# Patient Record
Sex: FEMALE | Race: WHITE | NOT HISPANIC OR LATINO | Employment: UNEMPLOYED | URBAN - METROPOLITAN AREA
[De-identification: names, ages, dates, MRNs, and addresses within clinical notes are randomized per-mention and may not be internally consistent; named-entity substitution may affect disease eponyms.]

---

## 2017-01-19 ENCOUNTER — OFFICE VISIT (OUTPATIENT)
Dept: OBSTETRICS AND GYNECOLOGY | Facility: CLINIC | Age: 37
End: 2017-01-19
Payer: MEDICAID

## 2017-01-19 VITALS
HEIGHT: 62 IN | SYSTOLIC BLOOD PRESSURE: 102 MMHG | BODY MASS INDEX: 18.3 KG/M2 | DIASTOLIC BLOOD PRESSURE: 60 MMHG | WEIGHT: 99.44 LBS

## 2017-01-19 DIAGNOSIS — Z79.890 ON HORMONE REPLACEMENT THERAPY: Primary | ICD-10-CM

## 2017-01-19 PROCEDURE — 99212 OFFICE O/P EST SF 10 MIN: CPT | Mod: PBBFAC,PO | Performed by: MIDWIFE

## 2017-01-19 PROCEDURE — 99499 UNLISTED E&M SERVICE: CPT | Mod: S$PBB,,, | Performed by: MIDWIFE

## 2017-01-19 PROCEDURE — 99999 PR PBB SHADOW E&M-EST. PATIENT-LVL II: CPT | Mod: PBBFAC,,, | Performed by: MIDWIFE

## 2017-01-19 RX ORDER — ESTRADIOL 2 MG/1
2 TABLET ORAL DAILY
Refills: 4 | COMMUNITY
Start: 2016-12-17 | End: 2017-02-13 | Stop reason: ALTCHOICE

## 2017-01-19 RX ORDER — OMEPRAZOLE 40 MG/1
40 CAPSULE, DELAYED RELEASE ORAL EVERY MORNING
Refills: 2 | COMMUNITY
Start: 2016-12-10 | End: 2020-01-24

## 2017-01-19 RX ORDER — LISINOPRIL 5 MG/1
5 TABLET ORAL DAILY
Refills: 2 | COMMUNITY
Start: 2017-01-03 | End: 2020-01-24

## 2017-01-19 NOTE — LETTER
January 19, 2017      Naren Garcia MD  [address] [place] [id number]           [facility]  [id number] [place] [address]  Phone: [phone]          Patient: Lyndsey Vigil   MR Number: [id number]   YOB: 1980   Date of Visit: 1/19/2017       Dear Dr. Naren Garcia:    Thank you for referring Lyndsey Vigil to me for evaluation. Attached you will find relevant portions of my assessment and plan of care.    If you have questions, please do not hesitate to call me. I look forward to following Lyndsey Vigil along with you.    Sincerely,    Sukhi Light, [facility]    Enclosure  CC:  No Recipients    If you would like to receive this communication electronically, please contact [email or web address] or [phone] to request more information on [facility] Link access.    For providers and/or their staff who would like to refer a patient to [facility], please contact us through our one-stop-shop provider referral line, [facility] , at [phone].    If you feel you have received this communication in error or would no longer like to receive these types of communications, please e-mail [email or web address]

## 2017-01-19 NOTE — MR AVS SNAPSHOT
"    [facility]  [address] [address] [address]  Phone: [phone]                  Lyndsey Vigil   2017 9:30 AM   Office Visit    Description:  Female : 1980   Provider:  Sukhi Light CNM   Department:  [facility]                To Do List           Future Appointments        Provider Department Dept Phone    2017 11:30 AM Destini La MD [facility]--[phone]      Goals (5 Years of Data)     None      [facility] On Call     [facility] On Call Nurse Care Line -  Assistance  Registered nurses in the [facility] On Call Center provide clinical advisement, health education, appointment booking, and other advisory services.  Call for this free service at [phone].             Medications                Verify that the below list of medications is an accurate representation of the medications you are currently taking.  If none reported, the list may be blank. If incorrect, please contact your healthcare provider. Carry this list with you in case of emergency.           Current Medications     alprazolam (XANAX) 1 MG tablet Take 1 mg by mouth 2 (two) times daily.    estradiol (ESTRACE) 2 MG tablet Take 2 mg by mouth once daily.    lisinopril (PRINIVIL,ZESTRIL) 5 MG tablet Take 5 mg by mouth once daily.    omeprazole (PRILOSEC) 40 MG capsule Take 40 mg by mouth every morning.    ondansetron (ZOFRAN) 4 MG tablet Take 8 mg by mouth every 8 (eight) hours.    zolpidem (AMBIEN) 10 mg Tab Take 5 mg by mouth nightly as needed.           Clinical Reference Information           Vital Signs - Last Recorded  Most recent update: 2017  9:42 AM by Liz Maher LPN    Ht Wt BMI          5' 2" (1.575 m) 45.1 kg (99 lb 6.8 oz) 18.19 kg/m2        Allergies as of 2017     Nsaids (Non-steroidal Anti-inflammatory Drug)    Pcn [Penicillins]      Immunizations Administered on Date of Encounter - 2017     None      [facility] Sign-Up     Activating your " [facility] account is as easy as 1-2-3!     1) Visit [email or web address], select Sign Up Now, enter this activation code and your date of birth, then select Next.  [email or web address]  Expires: 3/5/2017  9:57 AM      2) Create a username and password to use when you visit [facility] in the future and select a security question in case you lose your password and select Next.    3) Enter your e-mail address and click Sign Up!    Additional Information  If you have questions, please e-mail [email or web address] or call [phone] to talk to our [facility] staff. Remember, [facility] is NOT to be used for urgent needs. For medical emergencies, dial 911.         Smoking Cessation     If you would like to quit smoking:   You may be eligible for free services if you are a [place] resident and started smoking cigarettes before September 1, 1988.  Call the Smoking Cessation Trust (SCT) toll free at (099) 549-1868 or (167) 844-8122.   Call 6-179-QUIT-NOW if you do not meet the above criteria.

## 2017-02-13 ENCOUNTER — TELEPHONE (OUTPATIENT)
Dept: OBSTETRICS AND GYNECOLOGY | Facility: CLINIC | Age: 37
End: 2017-02-13

## 2017-02-13 ENCOUNTER — OFFICE VISIT (OUTPATIENT)
Dept: OBSTETRICS AND GYNECOLOGY | Facility: CLINIC | Age: 37
End: 2017-02-13
Payer: MEDICAID

## 2017-02-13 VITALS — BODY MASS INDEX: 17.74 KG/M2 | WEIGHT: 97 LBS | SYSTOLIC BLOOD PRESSURE: 98 MMHG | DIASTOLIC BLOOD PRESSURE: 60 MMHG

## 2017-02-13 DIAGNOSIS — R68.82 LIBIDO, DECREASED: ICD-10-CM

## 2017-02-13 DIAGNOSIS — E89.40 POSTSURGICAL MENOPAUSE: Primary | ICD-10-CM

## 2017-02-13 PROCEDURE — 99212 OFFICE O/P EST SF 10 MIN: CPT | Mod: PBBFAC,PO | Performed by: OBSTETRICS & GYNECOLOGY

## 2017-02-13 PROCEDURE — 99999 PR PBB SHADOW E&M-EST. PATIENT-LVL II: CPT | Mod: PBBFAC,,, | Performed by: OBSTETRICS & GYNECOLOGY

## 2017-02-13 PROCEDURE — 99213 OFFICE O/P EST LOW 20 MIN: CPT | Mod: S$PBB,,, | Performed by: OBSTETRICS & GYNECOLOGY

## 2017-02-13 RX ORDER — SUMATRIPTAN SUCCINATE 25 MG/1
1 TABLET ORAL CONTINUOUS PRN
Refills: 1 | COMMUNITY
Start: 2017-01-31 | End: 2020-01-24

## 2017-02-13 RX ORDER — ESTERIFIED ESTROGEN AND METHYLTESTOSTERONE .625; 1.25 MG/1; MG/1
1 TABLET ORAL DAILY
Qty: 30 TABLET | Refills: 5 | Status: SHIPPED | OUTPATIENT
Start: 2017-02-13 | End: 2017-02-14

## 2017-02-13 NOTE — TELEPHONE ENCOUNTER
Spoke with the Prior authorization department and estratest is not covered on her insurance plan. The only alternative is to prescribe the medications separately.

## 2017-02-15 RX ORDER — ESTRADIOL 2 MG/1
2 TABLET ORAL DAILY
Qty: 30 TABLET | Refills: 11 | Status: SHIPPED | OUTPATIENT
Start: 2017-02-15 | End: 2018-02-17 | Stop reason: SDUPTHER

## 2017-02-15 NOTE — TELEPHONE ENCOUNTER
Testosterone cream 1mg daily called to compounding pharmacy-they will call pt. Can increase to 2mg if no improvement after 2months. Estrace will be refilled as previously taken, to normal pharmacy

## 2017-02-15 NOTE — TELEPHONE ENCOUNTER
Spoke with the patient and notified her that Dr La sent the testosterone cream to the compound pharmacy and the estrace will still be sent to the regular pharmacy. If it doesn't help then we can increase it after 2 months. Patient verbalized understanding and it's going to cost her $50 a month, joann

## 2017-02-15 NOTE — TELEPHONE ENCOUNTER
Spoke with the patient to let her know what was going on and she said that she is fine with using the compound pharmacy and is fine with coming to [facility] to get it. I informed the patient I would let Dr La know and she will call it in for her. Patient verbalized understanding, Radha

## 2017-02-15 NOTE — TELEPHONE ENCOUNTER
Pt will have to get Rx from compounding pharmacy. Methyltestosterone only comes in 10mg tablets which is more than is recommended. Will have to call in

## 2017-02-15 NOTE — PROGRESS NOTES
*pt seen   Subjective:       Patient ID: Lyndsey Vigil is a 36 y.o. female.    Chief Complaint:  Consult (hrt)      History of Present Illness  HPI  35 yo  presents as new pt to discuss ERT. Has been tried on multiple types since hyst/bso, was doing better on estrace 2mg but now not working as well. Also c/o decreased libido over past few years, causing strain in relationship.     Past Medical History   Diagnosis Date    Anxiety     Depression        Past Surgical History   Procedure Laterality Date    Hysterectomy      Oophorectomy      Breast surgery      Tubal ligation      [place] tooth extraction     *hyst/bso by Dr. Quyen Williamson secondary to menstrual disorder & ovarian cysts;     Family History   Problem Relation Age of Onset    Breast cancer Neg Hx     Cancer Neg Hx     Ovarian cancer Neg Hx        Social History     Social History    Marital status: Single     Spouse name: N/A    Number of children: N/A    Years of education: N/A     Social History Main Topics    Smoking status: Current Every Day Smoker     Packs/day: 1.00     Types: Cigarettes    Smokeless tobacco: Never Used    Alcohol use No    Drug use: Yes    Sexual activity: Yes     Partners: Male     Other Topics Concern    None     Social History Narrative       Current Outpatient Prescriptions   Medication Sig Dispense Refill    lisinopril (PRINIVIL,ZESTRIL) 5 MG tablet Take 5 mg by mouth once daily.  2    omeprazole (PRILOSEC) 40 MG capsule Take 40 mg by mouth every morning.  2    ondansetron (ZOFRAN) 4 MG tablet Take 8 mg by mouth every 8 (eight) hours.      sumatriptan (IMITREX) 25 MG Tab Take 1 tablet by mouth continuous prn.  1     No current facility-administered medications for this visit.        Review of patient's allergies indicates:   Allergen Reactions    Nsaids (non-steroidal anti-inflammatory drug)     Pcn [penicillins]        GYN & OB History  No LMP recorded. Patient has had a hysterectomy.    Date of Last Pap: 1 mo ago per pt-normal  Last mmg  normal per pt    OB History    Para Term  AB SAB TAB Ectopic Multiple Living   1         1      # Outcome Date GA Lbr Mark/2nd Weight Sex Delivery Anes PTL Lv   1                    Review of Systems  Review of Systems   All other systems reviewed and are negative.    Objective:    Physical Exam:   Constitutional: She is oriented to person, place, and time. She appears well-developed and well-nourished.        Pulmonary/Chest: Effort normal.                  Musculoskeletal: Normal range of motion.       Neurological: She is alert and oriented to person, place, and time.    Skin: Skin is warm and dry.    Psychiatric: She has a normal mood and affect. Her behavior is normal.          Assessment:        1. Postsurgical menopause    2. Libido, decreased      Plan:      1. R/b/a of continuing on ERT  2. R/b/a/se of testosterone off-label use for libido  3. Will try pt on estratest

## 2017-11-06 ENCOUNTER — HOSPITAL ENCOUNTER (OUTPATIENT)
Dept: RADIOLOGY | Facility: HOSPITAL | Age: 37
Discharge: HOME | End: 2017-11-06
Attending: NURSE PRACTITIONER
Payer: MEDICAID

## 2017-11-06 DIAGNOSIS — M54.50 LOW BACK PAIN: ICD-10-CM

## 2017-11-06 DIAGNOSIS — M41.20 SCOLIOSIS (AND KYPHOSCOLIOSIS), IDIOPATHIC: ICD-10-CM

## 2017-11-06 DIAGNOSIS — M41.20 SCOLIOSIS (AND KYPHOSCOLIOSIS), IDIOPATHIC: Primary | ICD-10-CM

## 2017-11-06 PROCEDURE — 72100 X-RAY EXAM L-S SPINE 2/3 VWS: CPT | Mod: 26,,, | Performed by: RADIOLOGY

## 2017-11-06 PROCEDURE — 72100 X-RAY EXAM L-S SPINE 2/3 VWS: CPT | Mod: TC,PO

## 2018-02-18 RX ORDER — ESTRADIOL 2 MG/1
TABLET ORAL
Qty: 30 TABLET | Refills: 11 | Status: SHIPPED | OUTPATIENT
Start: 2018-02-18 | End: 2019-03-08 | Stop reason: SDUPTHER

## 2019-03-12 ENCOUNTER — TELEPHONE (OUTPATIENT)
Dept: OBSTETRICS AND GYNECOLOGY | Facility: CLINIC | Age: 39
End: 2019-03-12

## 2019-03-12 RX ORDER — ESTRADIOL 2 MG/1
TABLET ORAL
Qty: 30 TABLET | Refills: 11 | Status: SHIPPED | OUTPATIENT
Start: 2019-03-12 | End: 2020-01-24 | Stop reason: ALTCHOICE

## 2019-03-12 NOTE — TELEPHONE ENCOUNTER
----- Message from Yadi Palacios sent at 3/12/2019 12:51 PM CDT -----  Contact: pt  States she is having problems with her bladder and going to the bathroom frequently. States she had a total hysterectomy five years ago. She has Medicaid, nothing coming up on the schedule. Please call pt [phone]. Thank you

## 2019-03-12 NOTE — TELEPHONE ENCOUNTER
Spoke to patient and scheduled her an appointment for 05/06/19 at 9:00am at the [facility] location to see Dr. La. Patient verbalized understanding.

## 2019-05-06 ENCOUNTER — OFFICE VISIT (OUTPATIENT)
Dept: OBSTETRICS AND GYNECOLOGY | Facility: CLINIC | Age: 39
End: 2019-05-06
Payer: MEDICAID

## 2019-05-06 VITALS
BODY MASS INDEX: 19.23 KG/M2 | SYSTOLIC BLOOD PRESSURE: 140 MMHG | WEIGHT: 105.19 LBS | DIASTOLIC BLOOD PRESSURE: 90 MMHG

## 2019-05-06 DIAGNOSIS — F41.9 ANXIETY: ICD-10-CM

## 2019-05-06 DIAGNOSIS — Z01.419 ENCOUNTER FOR GYNECOLOGICAL EXAMINATION WITHOUT ABNORMAL FINDING: Primary | ICD-10-CM

## 2019-05-06 PROCEDURE — 99395 PR PREVENTIVE VISIT,EST,18-39: ICD-10-PCS | Mod: S$PBB,,, | Performed by: OBSTETRICS & GYNECOLOGY

## 2019-05-06 PROCEDURE — 99999 PR PBB SHADOW E&M-EST. PATIENT-LVL II: CPT | Mod: PBBFAC,,, | Performed by: OBSTETRICS & GYNECOLOGY

## 2019-05-06 PROCEDURE — 99999 PR PBB SHADOW E&M-EST. PATIENT-LVL II: ICD-10-PCS | Mod: PBBFAC,,, | Performed by: OBSTETRICS & GYNECOLOGY

## 2019-05-06 PROCEDURE — 99395 PREV VISIT EST AGE 18-39: CPT | Mod: S$PBB,,, | Performed by: OBSTETRICS & GYNECOLOGY

## 2019-05-06 PROCEDURE — 99212 OFFICE O/P EST SF 10 MIN: CPT | Mod: PBBFAC,PO | Performed by: OBSTETRICS & GYNECOLOGY

## 2019-05-06 NOTE — PROGRESS NOTES
CC: Well woman exam    Lyndsey Vigil is a 38 y.o. female  presents for a well woman exam. Doing well on estrace 2mg. Did not f/u w/ estratest or testosterone use. Pt started taking matcha tablets to boost libido. C/o urinary problems-sits to void, feels like it is normal amount & that she is emptying but sits while she is on her phone & more come out that feels like a full amount. No incontinence/urgency/dysuria/nocturia. Drinks 2 c coffee & about 4 bottles water daily. Also c/o episodic anxiety; has had it in past but never medicated bc has been manageable; ex. Has anxiety about [place]. Has seen PCP who referred her to /therapist who wanted to put her on daily antidepressant; pt does not want to take anything daily    Past Medical History:   Diagnosis Date    Anxiety     Depression     Hypertension     Migraine      Past Surgical History:   Procedure Laterality Date    BREAST SURGERY      HYSTERECTOMY      OOPHORECTOMY      TUBAL LIGATION      WISDOM TOOTH EXTRACTION       Family History   Problem Relation Age of Onset    Breast cancer Neg Hx     Cancer Neg Hx     Ovarian cancer Neg Hx     Colon cancer Neg Hx      Social History     Tobacco Use    Smoking status: Current Some Day Smoker     Packs/day: 1.00     Types: Cigarettes, Vaping with nicotine    Smokeless tobacco: Never Used   Substance Use Topics    Alcohol use: No    Drug use: Yes     Types: Heroin     Comment: sober 5 years     OB History        1    Para        Term                AB        Living   1       SAB        TAB        Ectopic        Multiple        Live Births                     Current Outpatient Medications:     estradiol (ESTRACE) 2 MG tablet, TAKE ONE TABLET BY MOUTH ONCE DAILY, Disp: 30 tablet, Rfl: 11    omeprazole (PRILOSEC) 40 MG capsule, Take 40 mg by mouth every morning., Disp: , Rfl: 2    ondansetron (ZOFRAN) 4 MG tablet, Take 8 mg by mouth every 8 (eight)  hours., Disp: , Rfl:     sumatriptan (IMITREX) 25 MG Tab, Take 1 tablet by mouth continuous prn., Disp: , Rfl: 1    lisinopril (PRINIVIL,ZESTRIL) 5 MG tablet, Take 5 mg by mouth once daily., Disp: , Rfl: 2    GYNECOLOGY HISTORY:  No abnormal pap/std    DATA REVIEWED:  Last pap: normal Date: >2 y ago  Last mmg: normal Date: 8 y ago    BP (!) 140/90   Wt 47.7 kg (105 lb 2.6 oz)   BMI 19.23 kg/m²     ROS:  GENERAL: Denies weight gain or weight loss. Feeling well overall.   SKIN: Denies rash or lesions.   HEAD: Denies head injury or headache.   NODES: Denies enlarged lymph nodes.   CHEST: Denies chest pain or shortness of breath.   CARDIOVASCULAR: Denies palpitations or left sided chest pain.   ABDOMEN: No abdominal pain, constipation, diarrhea, nausea, vomiting or rectal bleeding.   URINARY: No frequency, dysuria, hematuria, or burning on urination.  REPRODUCTIVE: See HPI.   BREASTS: The patient denies pain, lumps, or nipple discharge.   HEMATOLOGIC: No easy bruisability or excessive bleeding.   MUSCULOSKELETAL: Denies joint pain or swelling.   NEUROLOGIC: Denies syncope or weakness.   PSYCHIATRIC: Denies depression, anxiety or mood swings.    PE:   APPEARANCE: Well nourished, well developed, in no acute distress.  AFFECT: WNL, alert and oriented x 3.  SKIN: No acne or hirsutism.  NECK: Neck symmetric without masses or thyromegaly.  NODES: No inguinal, cervical, axillary or femoral lymph node enlargement.  CHEST: Good respiratory effort.   ABDOMEN: Soft. No tenderness or masses. No hepatosplenomegaly. No hernias.  BREASTS: Symmetrical, no skin changes or visible lesions. No palpable masses, nipple discharge bilaterally.  PELVIC: Normal external female genitalia without lesions. Normal hair distribution. Adequate perineal body, normal urethral meatus. Vagina atrophic without lesions or discharge. No significant cystocele or rectocele. Bimanual exam shows uterus and cervix to be surgically absent. Adnexa without  masses or tenderness.  EXTREMITIES: No edema.    Post void residual: pt self-urinated 110 cc; kam inserted & minimal to no urine returned    Encounter for gynecological examination without abnormal finding    Anxiety    Patient was counseled today on A.C.S. Pap guidelines (none needed) and recommendations for yearly pelvic exams, yearly mammograms starting age 40, and clinical breast exams; to see her PCP for other health maintenance. Pt reassured she is emptying well. rec seeing PCP for anxiety

## 2020-01-08 ENCOUNTER — TELEPHONE (OUTPATIENT)
Dept: OBSTETRICS AND GYNECOLOGY | Facility: CLINIC | Age: 40
End: 2020-01-08

## 2020-01-08 NOTE — TELEPHONE ENCOUNTER
Returned pt call to reschedule pt for an earlier appt for a hormone consult. Confirmed 1/24/20 at 10:30 am with GINO Razo ([facility]). Pt verbalized understanding.

## 2020-01-08 NOTE — TELEPHONE ENCOUNTER
----- Message from Yvonne Elizalde sent at 1/8/2020  9:41 AM CST -----  Contact: patient  Patient called to schedule an hormone consult appointment   And wishes to speak with a nurse regarding this matter.      she  can be reached at [phone]    Thanks  KB

## 2020-01-08 NOTE — TELEPHONE ENCOUNTER
----- Message from Callie Meza sent at 1/8/2020  3:10 PM CST -----  Contact: pt  .Type:  Sooner Apoointment Request    Caller is requesting a sooner appointment.  Caller declined first available appointment listed below.  Caller will not accept being placed on the waitlist and is requesting a message be sent to doctor.  Name of Caller: pt  When is the first available appointment? ----  Symptoms: problem concern   Would the patient rather a call back or a response via [facility]?  Call back   Best Call Back Number: [phone] (home)   Additional Information:  Pt wants to know when is the soonest appt at [facility]

## 2020-01-24 ENCOUNTER — OFFICE VISIT (OUTPATIENT)
Dept: OBSTETRICS AND GYNECOLOGY | Facility: CLINIC | Age: 40
End: 2020-01-24
Payer: MEDICAID

## 2020-01-24 VITALS
SYSTOLIC BLOOD PRESSURE: 122 MMHG | DIASTOLIC BLOOD PRESSURE: 98 MMHG | BODY MASS INDEX: 19.27 KG/M2 | WEIGHT: 105.31 LBS

## 2020-01-24 DIAGNOSIS — R68.82 DECREASED LIBIDO: ICD-10-CM

## 2020-01-24 DIAGNOSIS — F41.9 SEVERE ANXIETY: ICD-10-CM

## 2020-01-24 DIAGNOSIS — Z78.0 MENOPAUSE: Primary | ICD-10-CM

## 2020-01-24 PROCEDURE — 99999 PR PBB SHADOW E&M-EST. PATIENT-LVL III: CPT | Mod: PBBFAC,,, | Performed by: OBSTETRICS & GYNECOLOGY

## 2020-01-24 PROCEDURE — 99213 OFFICE O/P EST LOW 20 MIN: CPT | Mod: PBBFAC | Performed by: OBSTETRICS & GYNECOLOGY

## 2020-01-24 PROCEDURE — 99213 PR OFFICE/OUTPT VISIT, EST, LEVL III, 20-29 MIN: ICD-10-PCS | Mod: S$PBB,,, | Performed by: OBSTETRICS & GYNECOLOGY

## 2020-01-24 PROCEDURE — 99999 PR PBB SHADOW E&M-EST. PATIENT-LVL III: ICD-10-PCS | Mod: PBBFAC,,, | Performed by: OBSTETRICS & GYNECOLOGY

## 2020-01-24 PROCEDURE — 99213 OFFICE O/P EST LOW 20 MIN: CPT | Mod: S$PBB,,, | Performed by: OBSTETRICS & GYNECOLOGY

## 2020-01-24 RX ORDER — ONDANSETRON 4 MG/1
TABLET, ORALLY DISINTEGRATING ORAL
COMMUNITY
Start: 2019-11-08

## 2020-01-24 RX ORDER — ESTRADIOL 2 MG/1
TABLET ORAL
COMMUNITY
End: 2020-01-24 | Stop reason: ALTCHOICE

## 2020-01-24 RX ORDER — OMEPRAZOLE 20 MG/1
CAPSULE, DELAYED RELEASE ORAL
COMMUNITY
Start: 2020-01-21

## 2020-01-24 RX ORDER — ESTERIFIED ESTROGEN AND METHYLTESTOSTERONE 1.25; 2.5 MG/1; MG/1
1 TABLET ORAL DAILY
Qty: 30 TABLET | Refills: 4 | Status: SHIPPED | OUTPATIENT
Start: 2020-01-24 | End: 2020-01-29

## 2020-01-24 RX ORDER — LISINOPRIL 5 MG/1
TABLET ORAL
COMMUNITY
End: 2020-05-04

## 2020-01-24 RX ORDER — SUMATRIPTAN SUCCINATE 25 MG/1
TABLET ORAL
COMMUNITY

## 2020-01-24 NOTE — PROGRESS NOTES
Subjective:       Patient ID: Lyndsey Vigil is a 39 y.o. female.    Chief Complaint:  Low Testosterone      History of Present Illness  HPI  Pt complains of absent libido.  Pt has been seeing Dr. La for this before and was given testosterone cream in addition to her HRT.  Pt stopped using the cream because it was too expensive.  Would like to discuss options again and is interested in the injections.  Pt also reports history of severe anxiety and phobias.  Is currently not on any therapy.    GYN & OB History  No LMP recorded. Patient has had a hysterectomy.   Date of Last Pap: No result found    OB History    Para Term  AB Living   1 1 1   0 1   SAB TAB Ectopic Multiple Live Births   0 0 0          # Outcome Date GA Lbr Mark/2nd Weight Sex Delivery Anes PTL Lv   1 Term                Review of Systems  Review of Systems   Constitutional: Positive for fatigue. Negative for activity change, appetite change, chills, fever and unexpected weight change.   Respiratory: Negative for shortness of breath.    Cardiovascular: Negative for chest pain, palpitations and leg swelling.   Gastrointestinal: Negative for abdominal pain, bloating, blood in stool, constipation, diarrhea, nausea and vomiting.   Genitourinary: Positive for decreased libido and hot flashes. Negative for dyspareunia, dysuria, flank pain, frequency, genital sores, hematuria, pelvic pain, urgency, vaginal bleeding, vaginal discharge, vaginal pain, urinary incontinence, vaginal dryness and vaginal odor.   Musculoskeletal: Negative for back pain.   Neurological: Negative for syncope and headaches.   Psychiatric/Behavioral: Negative for depression. The patient is nervous/anxious.            Objective:    Physical Exam:   Constitutional: She is oriented to person, place, and time. She appears well-developed and well-nourished. No distress.                           Neurological: She is alert and oriented to person, place, and time.      Psychiatric: She has a normal mood and affect. Her behavior is normal. Thought content normal.          Assessment:        1. Menopause    2. Decreased libido    3. Severe anxiety             Plan:      Menopause  -     estrogens,conjugated,-methyltestosterone 1.25-2.5mg (ESTRATEST) 1.25-2.5 mg per tablet; Take 1 tablet by mouth once daily.  Dispense: 30 tablet; Refill: 4  -     Pt was counseled on treatment options, including associated risks and benefits of each.  Pt voiced understanding and desires to switch to Estratest.  Medication dosing, side-effects, risks, benefits, and alternatives were discussed.  Medical history was reviewed and pt is a candidate for HRT use.    Decreased libido  -     estrogens,conjugated,-methyltestosterone 1.25-2.5mg (ESTRATEST) 1.25-2.5 mg per tablet; Take 1 tablet by mouth once daily.  Dispense: 30 tablet; Refill: 4  -     Pt was counseled on libido, including the many factors that influence it.  Pt has several risk factors for decreased libido: history of depression/anxiety, early surgical menopause, HRT use, stress.  Pt was counseled on stress reduction and lifestyle modifications (healthy diet, exercise, consistent sleep hours, etc).  Pt also advised on option to seek counseling/Mental health evaluation.  Hormone testing is not indicated at this time.  Treatment options were reviewed with pt and pt would like to proceed with Estratest (had previously declined this option).  Pt voiced understanding.  -     Pt normally sees [facility]. La in [facility], but was not able to secure sooner visit.  Will follow up with Dr. La when she is due for annual exam in May.    Severe anxiety  -     Ambulatory Referral to Psychiatry      Follow up in about 4 months (around 5/24/2020) for Annual exam with Dr. La at [facility].

## 2020-01-27 ENCOUNTER — TELEPHONE (OUTPATIENT)
Dept: OBSTETRICS AND GYNECOLOGY | Facility: CLINIC | Age: 40
End: 2020-01-27

## 2020-01-27 DIAGNOSIS — Z78.0 MENOPAUSE: Primary | ICD-10-CM

## 2020-01-27 NOTE — TELEPHONE ENCOUNTER
----- Message from Rubia Fry sent at 1/27/2020  3:26 PM CST -----  Contact: self [phone]  States that her ins will not cover the rx for the new hormone medication. Pt did not remember name of medication. Please call back at [phone]//thank you acc

## 2020-01-27 NOTE — TELEPHONE ENCOUNTER
As we discussed at her last visit, Medicaid usually does not cover testosterone medications.  Thus, she would have to pay out of pocket if she desires to get these medications.  She can always go back to the medications that Dr. La prescribed her.

## 2020-01-27 NOTE — TELEPHONE ENCOUNTER
"Pt stated her insurance will not cover her new medication     " estrogens,conjugated,-methyltestosterone 1.25-2.5mg (ESTRATEST) 1.25-2.5 mg per tablet "    Pt would like to know is there anything else to call in?  Please advise. Thank you.   "

## 2020-01-27 NOTE — TELEPHONE ENCOUNTER
----- Message from Barbara Son sent at 1/24/2020  4:43 PM CST -----  Contact: pt   She's calling in regards to Rx being sent over     Pt is at pharm     pls call pt back to confirm rx been sent       at [phone] (zwoc)

## 2020-01-28 NOTE — TELEPHONE ENCOUNTER
Notified pt per Dr. Razo. Pt stated she would like to go back to the medication Dr. La prescribed her. notified pt I would call once the medication was called in. Pt verbalized understanding.

## 2020-01-29 RX ORDER — ESTRADIOL 2 MG/1
2 TABLET ORAL DAILY
Qty: 30 TABLET | Refills: 4 | Status: SHIPPED | OUTPATIENT
Start: 2020-01-29 | End: 2020-05-04 | Stop reason: SDUPTHER

## 2020-04-30 ENCOUNTER — TELEPHONE (OUTPATIENT)
Dept: OBSTETRICS AND GYNECOLOGY | Facility: CLINIC | Age: 40
End: 2020-04-30

## 2020-04-30 NOTE — TELEPHONE ENCOUNTER
----- Message from Theodora Ferrara sent at 4/30/2020 11:19 AM CDT -----  Contact: self/[phone]  Type:  Patient Returning Call    Who Called:pt  Who Left Message for Patient:nurse  Does the patient know what this is regarding?:return call  Would the patient rather a call back or a response via [facility]? Call back  Best Call Back Number:[phone]  Additional Information:

## 2020-04-30 NOTE — TELEPHONE ENCOUNTER
Patient rescheduled to 5/4/2020 at 11:30 am . Patient verbalized understanding of her appointment time being changed.

## 2020-05-04 ENCOUNTER — OFFICE VISIT (OUTPATIENT)
Dept: OBSTETRICS AND GYNECOLOGY | Facility: CLINIC | Age: 40
End: 2020-05-04
Payer: MEDICAID

## 2020-05-04 VITALS
WEIGHT: 110.44 LBS | BODY MASS INDEX: 18.85 KG/M2 | HEIGHT: 64 IN | DIASTOLIC BLOOD PRESSURE: 64 MMHG | SYSTOLIC BLOOD PRESSURE: 132 MMHG

## 2020-05-04 DIAGNOSIS — R68.82 DECREASED LIBIDO: ICD-10-CM

## 2020-05-04 DIAGNOSIS — Z79.890 SURGICAL MENOPAUSE ON HORMONE REPLACEMENT THERAPY: Primary | ICD-10-CM

## 2020-05-04 DIAGNOSIS — E89.40 SURGICAL MENOPAUSE ON HORMONE REPLACEMENT THERAPY: Primary | ICD-10-CM

## 2020-05-04 DIAGNOSIS — Z78.0 MENOPAUSE: ICD-10-CM

## 2020-05-04 DIAGNOSIS — F41.9 ANXIETY: ICD-10-CM

## 2020-05-04 PROCEDURE — 99213 OFFICE O/P EST LOW 20 MIN: CPT | Mod: S$PBB,,, | Performed by: OBSTETRICS & GYNECOLOGY

## 2020-05-04 PROCEDURE — 99213 OFFICE O/P EST LOW 20 MIN: CPT | Mod: PBBFAC,PO | Performed by: OBSTETRICS & GYNECOLOGY

## 2020-05-04 PROCEDURE — 99999 PR PBB SHADOW E&M-EST. PATIENT-LVL III: ICD-10-PCS | Mod: PBBFAC,,, | Performed by: OBSTETRICS & GYNECOLOGY

## 2020-05-04 PROCEDURE — 99213 PR OFFICE/OUTPT VISIT, EST, LEVL III, 20-29 MIN: ICD-10-PCS | Mod: S$PBB,,, | Performed by: OBSTETRICS & GYNECOLOGY

## 2020-05-04 PROCEDURE — 99999 PR PBB SHADOW E&M-EST. PATIENT-LVL III: CPT | Mod: PBBFAC,,, | Performed by: OBSTETRICS & GYNECOLOGY

## 2020-05-04 RX ORDER — ESTRADIOL 2 MG/1
2 TABLET ORAL DAILY
Qty: 90 TABLET | Refills: 3 | Status: SHIPPED | OUTPATIENT
Start: 2020-05-04 | End: 2021-05-04

## 2020-05-04 NOTE — PROGRESS NOTES
"  Subjective:       Patient ID: Lyndsey Vigil is a 39 y.o. female.    Chief Complaint:  Hormone follow-up (HRT follow-up, states "everything's fine.")      History of Present Illness  HPI  was instructed to f/u for estratest. Surgical menopause. Pt reports was only able to get 15 tablets bc insurance does not cover. Doing well on estrace. Reports has not received any phone calls about psych follow up for anxiety    GYN & OB History  No LMP recorded. Patient has had a hysterectomy.   Date of Last Pap: No result found    OB History    Para Term  AB Living   1 1 1   0 1   SAB TAB Ectopic Multiple Live Births   0 0 0          # Outcome Date GA Lbr Mark/2nd Weight Sex Delivery Anes PTL Lv   1 Term                Review of Systems  Review of Systems   All other systems reviewed and are negative.      Objective:     Physical Exam   Constitutional: She is oriented to person, place, and time. She appears well-developed and well-nourished.   Pulmonary/Chest: Effort normal.   Musculoskeletal: Normal range of motion.   Neurological: She is alert and oriented to person, place, and time.   Skin: Skin is warm and dry.   Psychiatric: She has a normal mood and affect. Her behavior is normal.        Assessment:        1. Surgical menopause on hormone replacement therapy    2. Decreased libido    3. Menopause         Plan:      1. Referral for psych placed again but instructed pt to look at list of covered providers per her insurance  2. Estrace refills      "

## 2025-02-20 ENCOUNTER — HOSPITAL ENCOUNTER (OUTPATIENT)
Dept: RADIOLOGY | Facility: HOSPITAL | Age: 45
Discharge: HOME | End: 2025-02-20
Attending: NURSE PRACTITIONER
Payer: MEDICAID

## 2025-02-20 VITALS — BODY MASS INDEX: 18.78 KG/M2 | WEIGHT: 110 LBS | HEIGHT: 64 IN

## 2025-02-20 DIAGNOSIS — Z12.31 ENCOUNTER FOR SCREENING MAMMOGRAM FOR MALIGNANT NEOPLASM OF BREAST: ICD-10-CM

## 2025-02-20 PROCEDURE — 77063 BREAST TOMOSYNTHESIS BI: CPT | Mod: TC,PO
